# Patient Record
Sex: FEMALE | Race: WHITE | NOT HISPANIC OR LATINO | Employment: OTHER | URBAN - METROPOLITAN AREA
[De-identification: names, ages, dates, MRNs, and addresses within clinical notes are randomized per-mention and may not be internally consistent; named-entity substitution may affect disease eponyms.]

---

## 2021-12-25 ENCOUNTER — HOSPITAL ENCOUNTER (EMERGENCY)
Facility: HOSPITAL | Age: 66
Discharge: HOME | End: 2021-12-25
Attending: EMERGENCY MEDICINE
Payer: MEDICARE

## 2021-12-25 VITALS
SYSTOLIC BLOOD PRESSURE: 122 MMHG | TEMPERATURE: 99 F | RESPIRATION RATE: 18 BRPM | DIASTOLIC BLOOD PRESSURE: 86 MMHG | HEIGHT: 60 IN | BODY MASS INDEX: 22.58 KG/M2 | OXYGEN SATURATION: 98 % | HEART RATE: 94 BPM | WEIGHT: 115 LBS

## 2021-12-25 DIAGNOSIS — L03.032 PARONYCHIA OF THIRD TOE, LEFT: Primary | ICD-10-CM

## 2021-12-25 PROCEDURE — 99284 EMERGENCY DEPT VISIT MOD MDM: CPT | Mod: ER

## 2021-12-25 PROCEDURE — 25000003 PHARM REV CODE 250: Mod: ER | Performed by: EMERGENCY MEDICINE

## 2021-12-25 RX ORDER — BACITRACIN ZINC 500 UNIT/G
OINTMENT (GRAM) TOPICAL 2 TIMES DAILY
Qty: 30 G | Refills: 0 | Status: SHIPPED | OUTPATIENT
Start: 2021-12-25

## 2021-12-25 RX ORDER — CEPHALEXIN 500 MG/1
500 CAPSULE ORAL EVERY 12 HOURS
Qty: 14 CAPSULE | Refills: 0 | Status: SHIPPED | OUTPATIENT
Start: 2021-12-25 | End: 2021-12-30

## 2021-12-25 RX ADMIN — BACITRACIN ZINC, NEOMYCIN, POLYMYXIN B 1 EACH: 400; 3.5; 5 OINTMENT TOPICAL at 05:12

## 2021-12-25 NOTE — ED PROVIDER NOTES
Encounter Date: 2021    SCRIBE #1 NOTE: [employer]Deep, am scribing for, and in the presence of, Ta Preston MD.       History     Chief Complaint   Patient presents with    Wound Check     Swelling/blister to left middle toe x 1 week, denies hx of DM     66 year old female with no known medical problems who presents to the ED with complaints of an abscess to her dorsal left middle toe for one week. Denies any drainage from abscess or fevers. No other complaints at this time.      The history is provided by the patient. No  was used.     Review of patient's allergies indicates:  No Known Allergies  History reviewed. No pertinent past medical history.  Past Surgical History:   Procedure Laterality Date     SECTION       History reviewed. No pertinent family history.  Social History     Tobacco Use    Smoking status: Never Smoker    Smokeless tobacco: Never Used   Substance Use Topics    Alcohol use: Never     Review of Systems   Constitutional: Negative for fever.   HENT: Negative for sore throat.    Eyes: Negative for pain.   Respiratory: Negative for shortness of breath.    Cardiovascular: Negative for chest pain.   Gastrointestinal: Negative for abdominal pain and nausea.   Genitourinary: Negative for dysuria.   Musculoskeletal: Negative for back pain.   Skin: Positive for wound. Negative for rash.        Negative for drainage.    Neurological: Negative for weakness.   All other systems reviewed and are negative.      Physical Exam     Initial Vitals [21 1612]   BP Pulse Resp Temp SpO2   119/85 88 20 98.6 °F (37 °C) 96 %      MAP       --         Physical Exam    Nursing note and vitals reviewed.  Constitutional: She is not diaphoretic. No distress.   HENT:   Head: Normocephalic and atraumatic.   Mouth/Throat: Oropharynx is clear and moist.   Eyes: Conjunctivae and EOM are normal. No scleral icterus.   Neck: Neck supple. No tracheal deviation present.   Normal  range of motion.  Cardiovascular: Normal rate, regular rhythm and intact distal pulses.   Pulmonary/Chest: Breath sounds normal. No stridor. No respiratory distress.   Abdominal: Abdomen is soft. She exhibits no distension. There is no abdominal tenderness.   Musculoskeletal:         General: Normal range of motion.      Cervical back: Normal range of motion and neck supple.     Neurological: She is alert. She has normal strength. No cranial nerve deficit or sensory deficit. GCS score is 15. GCS eye subscore is 4. GCS verbal subscore is 5. GCS motor subscore is 6.   Skin: Skin is warm and dry.   Paronychia of left third toe. No proximal streaking. No foot edema.    Psychiatric:   Anxious appearing         ED Course   Procedures  Labs Reviewed - No data to display       Imaging Results    None          Medications   neomycin-bacitracnZn-polymyxnB packet (1 each Topical (Top) Given 12/25/21 1720)     Medical Decision Making:   Differential Diagnosis:   My differential diagnosis includes, but is not limited to: paronychia, cellulitis, callous.   ED Management:  Patient is afebrile and in no acute distress at time history and physical.  She has a paronychia to the left 3rd toe.  There is no evidence of limb threatening infection.  Patient refuses to have paronychia drained.  Explained extensively to patient the importance drainage of paronychia to clear infection.  Patient requests antibiotic treatment only.  Patient informed that antibiotics may be unable to entirely clear her paronychia patient expresses understanding.  Warm soak of toe performed in the emergency department and dressed with bacitracin.  Patient counseled extensively on warm soaks and antibiotic ointment usage.  Prescribed course of Keflex to hopefully prevent cellulitis.  Patient given strict instructions to return to the emergency department for fever or worsening infection.  Referred to primary care and Podiatry for follow-up. counseled on  supportive care, appropriate medication usage, concerning symptoms for which to return to ER and the importance of follow up. Understanding and agreement with treatment plan was expressed.    This chart was completed using dictation software, as a result there may be some transcription errors.           Scribe Attestation:   Scribe #1: I performed the above scribed service and the documentation accurately describes the services I performed. I attest to the accuracy of the note.                 Clinical Impression:   Final diagnoses:  [L03.032] Paronychia of third toe, left (Primary)          ED Disposition Condition    Discharge Stable        ED Prescriptions     Medication Sig Dispense Start Date End Date Auth. Provider    bacitracin 500 unit/gram Oint Apply topically 2 (two) times daily. 30 g 12/25/2021  Ta Preston MD    cephALEXin (KEFLEX) 500 MG capsule Take 1 capsule (500 mg total) by mouth every 12 (twelve) hours. for 5 days 14 capsule 12/25/2021 12/30/2021 Ta Preston MD        Follow-up Information     Follow up With Specialties Details Why Contact Info    James Farnkel MD Family Medicine Schedule an appointment as soon as possible for a visit   [address] [facility] [address] [phone]      [facility] [place] [facility] Podiatry Schedule an appointment as soon as possible for a visit   [address] [place] [address] [phone]           I, Ta Preston , personally performed the services described in this documentation. All medical record entries made by the scribe were at my direction and in my presence. I have reviewed the chart and agree that the record reflects my personal performance and is accurate and complete.       Ta Preston MD  12/25/21 2500

## 2021-12-25 NOTE — DISCHARGE INSTRUCTIONS
You have a paronychia which is infection around the nail.  You have declined to have this strain.  You should soak your toe in warm water twice daily.  Apply antibiotic ointment twice daily.  Ideally the warm soaks will allow the pus to drain from under the skin.  Complete the course of antibiotics provided to attempt to clear this infection.  You should return to the emergency department for worsening swelling, pain, redness or fever as this indicates that you will require drainage of the pus.  Schedule close follow-up with a primary care physician and Podiatry.  Return to the emergency department for any new, worsening or significantly concerning symptoms.    Thank you for coming to our Emergency Department today. It is important to remember that some problems are difficult to diagnose and may not be found during your first visit. Be sure to follow up with your primary care doctor and review any labs/imaging that was performed with them. If you do not have a primary care doctor, you may contact the one listed on your discharge paperwork or you may also call the [facility] Appointment Desk at [phone] to schedule an appointment with one.     All medications may potentially have side effects and it is impossible to predict which medications may give you side effects. If you feel that you are having a negative effect of any medication you should immediately stop taking them and seek medical attention.    Return to the ER with any questions/concerns, new/concerning symptoms, worsening or failure to improve. Do not drive or make any important decisions for 24 hours if you have received any pain medications, sedatives or mood altering drugs during your ER visit.